# Patient Record
Sex: MALE | URBAN - NONMETROPOLITAN AREA
[De-identification: names, ages, dates, MRNs, and addresses within clinical notes are randomized per-mention and may not be internally consistent; named-entity substitution may affect disease eponyms.]

---

## 2023-07-07 ENCOUNTER — OFFICE VISIT (OUTPATIENT)
Dept: URBAN - NONMETROPOLITAN AREA CLINIC 14 | Facility: CLINIC | Age: 33
End: 2023-07-07
Payer: COMMERCIAL

## 2023-07-07 DIAGNOSIS — T15.02XA FOREIGN BODY IN CORNEA, LEFT EYE: Primary | ICD-10-CM

## 2023-07-07 PROCEDURE — 65222 REMOVE FOREIGN BODY FROM EYE: CPT | Performed by: OPTOMETRIST

## 2023-07-07 RX ORDER — NEOMYCIN SULFATE, POLYMYXIN B SULFATE AND DEXAMETHASONE 3.5; 10000; 1 MG/ML; [USP'U]/ML; MG/ML
SUSPENSION/ DROPS OPHTHALMIC
Qty: 5 | Refills: 0 | Status: ACTIVE
Start: 2023-07-07

## 2023-07-07 NOTE — IMPRESSION/PLAN
Impression: Foreign body in cornea, left eye: T15.02XA. Plan: Removed in office with bahman brush w/o complication. Placed BCL. Start maxtrol QID OS. RTC monday for F/U or sooner with changes.

## 2023-07-10 ENCOUNTER — OFFICE VISIT (OUTPATIENT)
Dept: URBAN - NONMETROPOLITAN AREA CLINIC 14 | Facility: CLINIC | Age: 33
End: 2023-07-10
Payer: COMMERCIAL

## 2023-07-10 DIAGNOSIS — T15.11XA FOREIGN BODY IN CONJUNCTIVAL SAC, RIGHT EYE, INITIAL ENCOUNTER: Primary | ICD-10-CM

## 2023-07-10 PROCEDURE — 92012 INTRM OPH EXAM EST PATIENT: CPT | Performed by: OPTOMETRIST

## 2023-07-10 NOTE — IMPRESSION/PLAN
Impression: Foreign body in conjunctival sac, right eye, initial encounter: T15.11XA. Plan: Removed BCL. Improved symptoms. Cont drops for 4 more days. RTC ASAP with any changes.